# Patient Record
Sex: MALE | Race: WHITE
[De-identification: names, ages, dates, MRNs, and addresses within clinical notes are randomized per-mention and may not be internally consistent; named-entity substitution may affect disease eponyms.]

---

## 2022-01-07 ENCOUNTER — HOSPITAL ENCOUNTER (INPATIENT)
Dept: HOSPITAL 47 - EC | Age: 42
LOS: 3 days | Discharge: HOME | DRG: 177 | End: 2022-01-10
Attending: INTERNAL MEDICINE | Admitting: INTERNAL MEDICINE
Payer: COMMERCIAL

## 2022-01-07 DIAGNOSIS — J96.01: ICD-10-CM

## 2022-01-07 DIAGNOSIS — U07.1: Primary | ICD-10-CM

## 2022-01-07 DIAGNOSIS — J12.82: ICD-10-CM

## 2022-01-07 DIAGNOSIS — Z79.899: ICD-10-CM

## 2022-01-07 DIAGNOSIS — J84.9: ICD-10-CM

## 2022-01-07 DIAGNOSIS — U09.9: ICD-10-CM

## 2022-01-07 LAB
ALBUMIN SERPL-MCNC: 4.3 G/DL (ref 3.5–5)
ALP SERPL-CCNC: 39 U/L (ref 38–126)
ALT SERPL-CCNC: 24 U/L (ref 4–49)
ANION GAP SERPL CALC-SCNC: 15 MMOL/L
APTT BLD: 24.7 SEC (ref 22–30)
AST SERPL-CCNC: 61 U/L (ref 17–59)
BASOPHILS # BLD AUTO: 0 K/UL (ref 0–0.2)
BASOPHILS NFR BLD AUTO: 0 %
BUN SERPL-SCNC: 28 MG/DL (ref 9–20)
CALCIUM SPEC-MCNC: 9.2 MG/DL (ref 8.4–10.2)
CHLORIDE SERPL-SCNC: 105 MMOL/L (ref 98–107)
CO2 SERPL-SCNC: 17 MMOL/L (ref 22–30)
EOSINOPHIL # BLD AUTO: 0 K/UL (ref 0–0.7)
EOSINOPHIL NFR BLD AUTO: 0 %
ERYTHROCYTE [DISTWIDTH] IN BLOOD BY AUTOMATED COUNT: 5.1 M/UL (ref 4.3–5.9)
ERYTHROCYTE [DISTWIDTH] IN BLOOD: 12.6 % (ref 11.5–15.5)
GLUCOSE SERPL-MCNC: 155 MG/DL (ref 74–99)
HCT VFR BLD AUTO: 45.8 % (ref 39–53)
HGB BLD-MCNC: 15.9 GM/DL (ref 13–17.5)
INR PPP: 1 (ref ?–1.2)
LYMPHOCYTES # SPEC AUTO: 0.7 K/UL (ref 1–4.8)
LYMPHOCYTES NFR SPEC AUTO: 5 %
MCH RBC QN AUTO: 31.2 PG (ref 25–35)
MCHC RBC AUTO-ENTMCNC: 34.8 G/DL (ref 31–37)
MCV RBC AUTO: 89.8 FL (ref 80–100)
MONOCYTES # BLD AUTO: 0.5 K/UL (ref 0–1)
MONOCYTES NFR BLD AUTO: 4 %
NEUTROPHILS # BLD AUTO: 12 K/UL (ref 1.3–7.7)
NEUTROPHILS NFR BLD AUTO: 90 %
PLATELET # BLD AUTO: 214 K/UL (ref 150–450)
POTASSIUM SERPL-SCNC: 5.5 MMOL/L (ref 3.5–5.1)
PROT SERPL-MCNC: 7.8 G/DL (ref 6.3–8.2)
PT BLD: 10.3 SEC (ref 9–12)
SODIUM SERPL-SCNC: 137 MMOL/L (ref 137–145)
WBC # BLD AUTO: 13.3 K/UL (ref 3.8–10.6)

## 2022-01-07 PROCEDURE — 87635 SARS-COV-2 COVID-19 AMP PRB: CPT

## 2022-01-07 PROCEDURE — 86140 C-REACTIVE PROTEIN: CPT

## 2022-01-07 PROCEDURE — 83615 LACTATE (LD) (LDH) ENZYME: CPT

## 2022-01-07 PROCEDURE — 96374 THER/PROPH/DIAG INJ IV PUSH: CPT

## 2022-01-07 PROCEDURE — 83605 ASSAY OF LACTIC ACID: CPT

## 2022-01-07 PROCEDURE — 85610 PROTHROMBIN TIME: CPT

## 2022-01-07 PROCEDURE — 36415 COLL VENOUS BLD VENIPUNCTURE: CPT

## 2022-01-07 PROCEDURE — 71275 CT ANGIOGRAPHY CHEST: CPT

## 2022-01-07 PROCEDURE — 85379 FIBRIN DEGRADATION QUANT: CPT

## 2022-01-07 PROCEDURE — 84145 PROCALCITONIN (PCT): CPT

## 2022-01-07 PROCEDURE — 80048 BASIC METABOLIC PNL TOTAL CA: CPT

## 2022-01-07 PROCEDURE — 83735 ASSAY OF MAGNESIUM: CPT

## 2022-01-07 PROCEDURE — 94640 AIRWAY INHALATION TREATMENT: CPT

## 2022-01-07 PROCEDURE — 93005 ELECTROCARDIOGRAM TRACING: CPT

## 2022-01-07 PROCEDURE — 85025 COMPLETE CBC W/AUTO DIFF WBC: CPT

## 2022-01-07 PROCEDURE — 96361 HYDRATE IV INFUSION ADD-ON: CPT

## 2022-01-07 PROCEDURE — 99285 EMERGENCY DEPT VISIT HI MDM: CPT

## 2022-01-07 PROCEDURE — 80053 COMPREHEN METABOLIC PANEL: CPT

## 2022-01-07 PROCEDURE — 84484 ASSAY OF TROPONIN QUANT: CPT

## 2022-01-07 PROCEDURE — 71046 X-RAY EXAM CHEST 2 VIEWS: CPT

## 2022-01-07 PROCEDURE — 71045 X-RAY EXAM CHEST 1 VIEW: CPT

## 2022-01-07 PROCEDURE — 85730 THROMBOPLASTIN TIME PARTIAL: CPT

## 2022-01-07 PROCEDURE — 83880 ASSAY OF NATRIURETIC PEPTIDE: CPT

## 2022-01-07 RX ADMIN — CEFAZOLIN SCH MLS/HR: 330 INJECTION, POWDER, FOR SOLUTION INTRAMUSCULAR; INTRAVENOUS at 23:31

## 2022-01-07 NOTE — ED
SOB HPI





- General


Chief Complaint: Shortness of Breath


Stated Complaint: SOB,Low O2 Sat.


Time Seen by Provider: 01/07/22 19:10


Source: patient, RN notes reviewed


Mode of arrival: ambulatory


Limitations: no limitations





- History of Present Illness


Initial Comments: 





Patient is a 41-year-old male that presents to the emergency department 

complaining of shortness of breath and cough for the past all days.  He notes he

did test positive for Covid.  He notes that he was followed by his primary 

caregiver and steroids and inhaler.  He came back to ER today due to increasing 

cough and continuing symptoms.  He was otherwise well-appearing.  He denied any 

other issues or complaints.  He denied chest pain headache nausea vomiting 

diarrhea constipation fever fatigue chills.





- Related Data


                                Home Medications











 Medication  Instructions  Recorded  Confirmed


 


Acetaminophen Tab [Tylenol Tab] 1,000 mg PO Q6HR PRN 01/07/22 01/07/22


 


Albuterol Inhaler [Ventolin Hfa 2 puff INHALATION RT-QID PRN 01/07/22 01/07/22





Inhaler]   


 


Ascorbic Acid [Vitamin C] 1,000 mg PO DAILY 01/07/22 01/07/22


 


Benzonatate [Benzonatate Perle] 200 mg PO TID PRN 01/07/22 01/07/22


 


Ibuprofen [Motrin Ib] 800 mg PO Q8H PRN 01/07/22 01/07/22


 


Multivitamins, Thera [Multivitamin 1 tab PO DAILY 01/07/22 01/07/22





(formulary)]   


 


predniSONE [Deltasone] 20 mg PO TID 01/07/22 01/07/22











                                    Allergies











Allergy/AdvReac Type Severity Reaction Status Date / Time


 


No Known Allergies Allergy   Verified 01/07/22 21:51














Review of Systems


ROS Statement: 


Those systems with pertinent positive or pertinent negative responses have been 

documented in the HPI.





ROS Other: All systems not noted in ROS Statement are negative.





Past Medical History


Past Medical History: No Reported History


History of Any Multi-Drug Resistant Organisms: None Reported


Past Surgical History: No Surgical Hx Reported


Past Psychological History: No Psychological Hx Reported


Smoking Status: Never smoker


Past Alcohol Use History: Occasional


Past Drug Use History: None Reported





General Exam


Limitations: no limitations


General appearance: alert, in no apparent distress


Head exam: Present: atraumatic, normocephalic, normal inspection


Eye exam: Present: normal appearance, PERRL, EOMI.  Absent: scleral icterus, 

conjunctival injection, periorbital swelling


ENT exam: Present: normal exam, mucous membranes moist


Neck exam: Present: normal inspection


Respiratory exam: Present: normal lung sounds bilaterally.  Absent: respiratory 

distress, wheezes, rales, rhonchi, stridor


Cardiovascular Exam: Present: regular rate, normal rhythm, normal heart sounds. 

 Absent: systolic murmur, diastolic murmur, rubs, gallop, clicks


GI/Abdominal exam: Present: soft, normal bowel sounds.  Absent: distended, 

tenderness, guarding, rebound, rigid


Extremities exam: Present: normal inspection, full ROM, normal capillary refill.

  Absent: tenderness, pedal edema, joint swelling, calf tenderness


Neurological exam: Present: alert, oriented X3


Psychiatric exam: Present: normal affect, normal mood


Skin exam: Present: warm, dry, intact, normal color.  Absent: rash





Course


                                   Vital Signs











  01/07/22 01/07/22 01/07/22





  19:01 22:30 22:36


 


Temperature 98.7 F  


 


Pulse Rate 86 79 79


 


Respiratory 20 18 18





Rate   


 


Blood Pressure 127/74 120/69 


 


O2 Sat by Pulse 92 L 88 L 94 L





Oximetry   














Procedures





- Universal Protocol (Time Out)


Nurse: Rojas Alanis





Medical Decision Making





- Medical Decision Making





41-year-old male complaining of shortness of breath and continuing Covid 

symptoms.


Labs, chest x-ray, EKG, 10 mg Decadron, albuterol inhaler ordered.


Labs: White blood cells 13.3, d-dimer 0.6 to CT of the chest ordered.  CMP shows

 lactic acid 2.3 rest of labs are unremarkable.


Chest x-ray and computed tomography scan both show interstitial pneumonia.


 computed tomography scan patient was coughing and was unable to breathe, 2 L of

 oxygen via nasal cannula ordered.


discussed with Dr. Corcoran, Patient will be admitted for hypoxia and Covid.


[facility] [place] [facility] was consulted and will accept the admit.





- Lab Data


Result diagrams: 


                                 01/07/22 19:22





                                 01/07/22 19:22


                                   Lab Results











  01/07/22 01/07/22 01/07/22 Range/Units





  19:22 19:22 19:22 


 


WBC  13.3 H    (3.8-10.6)  k/uL


 


RBC  5.10    (4.30-5.90)  m/uL


 


Hgb  15.9    (13.0-17.5)  gm/dL


 


Hct  45.8    (39.0-53.0)  %


 


MCV  89.8    (80.0-100.0)  fL


 


MCH  31.2    (25.0-35.0)  pg


 


MCHC  34.8    (31.0-37.0)  g/dL


 


RDW  12.6    (11.5-15.5)  %


 


Plt Count  214    (150-450)  k/uL


 


MPV  8.3    


 


Neutrophils %  90    %


 


Lymphocytes %  5    %


 


Monocytes %  4    %


 


Eosinophils %  0    %


 


Basophils %  0    %


 


Neutrophils #  12.0 H    (1.3-7.7)  k/uL


 


Lymphocytes #  0.7 L    (1.0-4.8)  k/uL


 


Monocytes #  0.5    (0-1.0)  k/uL


 


Eosinophils #  0.0    (0-0.7)  k/uL


 


Basophils #  0.0    (0-0.2)  k/uL


 


PT   10.3   (9.0-12.0)  sec


 


INR   1.0   (<1.2)  


 


APTT   24.7   (22.0-30.0)  sec


 


D-Dimer   0.62 H   (<0.60)  mg/L FEU


 


Sodium    137  (137-145)  mmol/L


 


Potassium    5.5 H  (3.5-5.1)  mmol/L


 


Chloride    105  ()  mmol/L


 


Carbon Dioxide    17 L  (22-30)  mmol/L


 


Anion Gap    15  mmol/L


 


BUN    28 H  (9-20)  mg/dL


 


Creatinine    0.62 L  (0.66-1.25)  mg/dL


 


Est GFR (CKD-EPI)AfAm    >90  (>60 ml/min/1.73 sqM)  


 


Est GFR (CKD-EPI)NonAf    >90  (>60 ml/min/1.73 sqM)  


 


Glucose    155 H  (74-99)  mg/dL


 


Lactic Ac Sepsis Rflx     


 


Plasma Lactic Acid Ash     (0.7-2.0)  mmol/L


 


Calcium    9.2  (8.4-10.2)  mg/dL


 


Total Bilirubin    1.2  (0.2-1.3)  mg/dL


 


AST    61 H  (17-59)  U/L


 


ALT    24  (4-49)  U/L


 


Alkaline Phosphatase    39  ()  U/L


 


Troponin I     (0.000-0.034)  ng/mL


 


NT-Pro-B Natriuret Pep     pg/mL


 


Total Protein    7.8  (6.3-8.2)  g/dL


 


Albumin    4.3  (3.5-5.0)  g/dL














  01/07/22 01/07/22 01/07/22 Range/Units





  19:22 19:22 19:22 


 


WBC     (3.8-10.6)  k/uL


 


RBC     (4.30-5.90)  m/uL


 


Hgb     (13.0-17.5)  gm/dL


 


Hct     (39.0-53.0)  %


 


MCV     (80.0-100.0)  fL


 


MCH     (25.0-35.0)  pg


 


MCHC     (31.0-37.0)  g/dL


 


RDW     (11.5-15.5)  %


 


Plt Count     (150-450)  k/uL


 


MPV     


 


Neutrophils %     %


 


Lymphocytes %     %


 


Monocytes %     %


 


Eosinophils %     %


 


Basophils %     %


 


Neutrophils #     (1.3-7.7)  k/uL


 


Lymphocytes #     (1.0-4.8)  k/uL


 


Monocytes #     (0-1.0)  k/uL


 


Eosinophils #     (0-0.7)  k/uL


 


Basophils #     (0-0.2)  k/uL


 


PT     (9.0-12.0)  sec


 


INR     (<1.2)  


 


APTT     (22.0-30.0)  sec


 


D-Dimer     (<0.60)  mg/L FEU


 


Sodium     (137-145)  mmol/L


 


Potassium     (3.5-5.1)  mmol/L


 


Chloride     ()  mmol/L


 


Carbon Dioxide     (22-30)  mmol/L


 


Anion Gap     mmol/L


 


BUN     (9-20)  mg/dL


 


Creatinine     (0.66-1.25)  mg/dL


 


Est GFR (CKD-EPI)AfAm     (>60 ml/min/1.73 sqM)  


 


Est GFR (CKD-EPI)NonAf     (>60 ml/min/1.73 sqM)  


 


Glucose     (74-99)  mg/dL


 


Lactic Ac Sepsis Rflx     


 


Plasma Lactic Acid Ash  2.3 H*    (0.7-2.0)  mmol/L


 


Calcium     (8.4-10.2)  mg/dL


 


Total Bilirubin     (0.2-1.3)  mg/dL


 


AST     (17-59)  U/L


 


ALT     (4-49)  U/L


 


Alkaline Phosphatase     ()  U/L


 


Troponin I   <0.012   (0.000-0.034)  ng/mL


 


NT-Pro-B Natriuret Pep    104  pg/mL


 


Total Protein     (6.3-8.2)  g/dL


 


Albumin     (3.5-5.0)  g/dL














  01/07/22 01/07/22 Range/Units





  19:50 22:30 


 


WBC    (3.8-10.6)  k/uL


 


RBC    (4.30-5.90)  m/uL


 


Hgb    (13.0-17.5)  gm/dL


 


Hct    (39.0-53.0)  %


 


MCV    (80.0-100.0)  fL


 


MCH    (25.0-35.0)  pg


 


MCHC    (31.0-37.0)  g/dL


 


RDW    (11.5-15.5)  %


 


Plt Count    (150-450)  k/uL


 


MPV    


 


Neutrophils %    %


 


Lymphocytes %    %


 


Monocytes %    %


 


Eosinophils %    %


 


Basophils %    %


 


Neutrophils #    (1.3-7.7)  k/uL


 


Lymphocytes #    (1.0-4.8)  k/uL


 


Monocytes #    (0-1.0)  k/uL


 


Eosinophils #    (0-0.7)  k/uL


 


Basophils #    (0-0.2)  k/uL


 


PT    (9.0-12.0)  sec


 


INR    (<1.2)  


 


APTT    (22.0-30.0)  sec


 


D-Dimer    (<0.60)  mg/L FEU


 


Sodium    (137-145)  mmol/L


 


Potassium    (3.5-5.1)  mmol/L


 


Chloride    ()  mmol/L


 


Carbon Dioxide    (22-30)  mmol/L


 


Anion Gap    mmol/L


 


BUN    (9-20)  mg/dL


 


Creatinine    (0.66-1.25)  mg/dL


 


Est GFR (CKD-EPI)AfAm    (>60 ml/min/1.73 sqM)  


 


Est GFR (CKD-EPI)NonAf    (>60 ml/min/1.73 sqM)  


 


Glucose    (74-99)  mg/dL


 


Lactic Ac Sepsis Rflx  Y   


 


Plasma Lactic Acid Ash   1.2  (0.7-2.0)  mmol/L


 


Calcium    (8.4-10.2)  mg/dL


 


Total Bilirubin    (0.2-1.3)  mg/dL


 


AST    (17-59)  U/L


 


ALT    (4-49)  U/L


 


Alkaline Phosphatase    ()  U/L


 


Troponin I    (0.000-0.034)  ng/mL


 


NT-Pro-B Natriuret Pep    pg/mL


 


Total Protein    (6.3-8.2)  g/dL


 


Albumin    (3.5-5.0)  g/dL














- EKG Data


-: EKG Interpreted by Me


EKG shows normal: sinus rhythm


Rate: normal


EKG Comments: 





Ventricular rate 76 bpm, UT interval 150 ms, QRS duration 92 ms,  ms, 

normal sinus rhythm, normal ECG.





- Radiology Data


Radiology results: report reviewed, image reviewed





Chest x-ray: There is some patchy interstitial pneumonia with small right 

pleural effusion.


CT of the chest: No evidence of pulmonary embolus.  Patchy bilateral 

predominantly interstitial pneumonia there is some airspace infiltrate and 

atelectasis at the posterior lung bases.





Disposition


Clinical Impression: 


 Pneumonia due to COVID-19 virus, Hypoxia





Disposition: ADMITTED AS IP TO THIS HOSP


Condition: Stable


Is patient prescribed a controlled substance at d/c from ED?: No


Referrals: 


Naga Reyes DO [Primary Care Provider] - 1-2 days


Time of Disposition: 23:03

## 2022-01-07 NOTE — CT
EXAMINATION TYPE: CT chest angio for PE

 

DATE OF EXAM: 1/7/2022

 

COMPARISON: None

 

HISTORY: pe

 

CT DLP: mGycm

Automated exposure control for dose reduction was used.

 

CONTRAST: 

Performed with IV Contrast, patient injected with mL of Isovue 370.

 

There are Three-D postprocessed images.

 

There is patchy predominantly interstitial infiltrates throughout both lungs. There is some coalescen
t density at the posterior lung bases. There is some patchy atelectasis posterior lung bases. Heart s
ize is normal. There is no pericardial effusion. There are no hilar masses. There is no mediastinal a
denopathy. Thoracic aorta is intact. There is no aneurysm or dissection.

 

There is no evidence of filling defect in the pulmonary arteries.

 

The thoracic spine is intact. There is no compression fracture. Sternum is intact. I see no bony dest
ructive process.

 

IMPRESSION:

No evidence of pulmonary embolism. Patchy bilateral predominantly interstitial pneumonia. There is so
me airspace infiltrate and atelectasis at the posterior lung bases.

## 2022-01-07 NOTE — XR
EXAMINATION TYPE: XR chest 2V

 

DATE OF EXAM: 1/7/2022

 

COMPARISON: NONE

 

HISTORY: Difficulty breathing

 

TECHNIQUE: 2 views

 

FINDINGS: There is patchy interstitial infiltrates in the mid and lower lung fields bilaterally. Hear
t size is normal. There is no heart failure. There is mild blunting right costophrenic angle.

 

IMPRESSION: There are some patchy interstitial pneumonia with small right pleural effusion. Normal he
art.

## 2022-01-08 LAB
ANION GAP SERPL CALC-SCNC: 7 MMOL/L
BASOPHILS # BLD AUTO: 0 K/UL (ref 0–0.2)
BASOPHILS NFR BLD AUTO: 0 %
BUN SERPL-SCNC: 19 MG/DL (ref 9–20)
CALCIUM SPEC-MCNC: 8.4 MG/DL (ref 8.4–10.2)
CHLORIDE SERPL-SCNC: 112 MMOL/L (ref 98–107)
CO2 SERPL-SCNC: 23 MMOL/L (ref 22–30)
EOSINOPHIL # BLD AUTO: 0 K/UL (ref 0–0.7)
EOSINOPHIL NFR BLD AUTO: 0 %
ERYTHROCYTE [DISTWIDTH] IN BLOOD BY AUTOMATED COUNT: 4.63 M/UL (ref 4.3–5.9)
ERYTHROCYTE [DISTWIDTH] IN BLOOD: 12.8 % (ref 11.5–15.5)
GLUCOSE SERPL-MCNC: 153 MG/DL (ref 74–99)
HCT VFR BLD AUTO: 42.3 % (ref 39–53)
HGB BLD-MCNC: 14.2 GM/DL (ref 13–17.5)
LDH SPEC-CCNC: 787 U/L (ref 313–618)
LYMPHOCYTES # SPEC AUTO: 0.6 K/UL (ref 1–4.8)
LYMPHOCYTES NFR SPEC AUTO: 5 %
MAGNESIUM SPEC-SCNC: 2.3 MG/DL (ref 1.6–2.3)
MCH RBC QN AUTO: 30.6 PG (ref 25–35)
MCHC RBC AUTO-ENTMCNC: 33.4 G/DL (ref 31–37)
MCV RBC AUTO: 91.4 FL (ref 80–100)
MONOCYTES # BLD AUTO: 0.5 K/UL (ref 0–1)
MONOCYTES NFR BLD AUTO: 4 %
NEUTROPHILS # BLD AUTO: 11.3 K/UL (ref 1.3–7.7)
NEUTROPHILS NFR BLD AUTO: 90 %
PLATELET # BLD AUTO: 221 K/UL (ref 150–450)
POTASSIUM SERPL-SCNC: 4.7 MMOL/L (ref 3.5–5.1)
SODIUM SERPL-SCNC: 142 MMOL/L (ref 137–145)
WBC # BLD AUTO: 12.5 K/UL (ref 3.8–10.6)

## 2022-01-08 RX ADMIN — CEFAZOLIN SCH: 330 INJECTION, POWDER, FOR SOLUTION INTRAMUSCULAR; INTRAVENOUS at 09:14

## 2022-01-08 RX ADMIN — ENOXAPARIN SODIUM SCH MG: 40 INJECTION SUBCUTANEOUS at 10:25

## 2022-01-08 RX ADMIN — CEFAZOLIN SCH MLS/HR: 330 INJECTION, POWDER, FOR SOLUTION INTRAMUSCULAR; INTRAVENOUS at 22:00

## 2022-01-08 RX ADMIN — DEXTROSE SCH MG: 50 INJECTION, SOLUTION INTRAVENOUS at 10:25

## 2022-01-08 RX ADMIN — CEFAZOLIN SCH MLS/HR: 330 INJECTION, POWDER, FOR SOLUTION INTRAMUSCULAR; INTRAVENOUS at 12:19

## 2022-01-08 NOTE — P.HPIM
History of Present Illness


H&P Date: 01/07/22


Chief Complaint: Hypoxemia





41-year-old male with no significant past medical history





Patient comes in due to hypoxemia home.  He was diagnosed with Covid about 12 

days ago.  Patient is unvaccinated.  He initially had symptoms of upper 

respiratory infection coughing body aches loss of smell and taste however he 

started improving except for his coughing and shortness of breath.  His wife has

been checking his oxygen and noted his oxygen was low in the upper 80s today for

which she recommended that she comes hospital for evaluation.  He's been taking 

inhalers at home along with prednisone


Upon evaluation in the ED his oxygen saturation was in the low 90s and then 

dropped to 8788 percent while on CAT scan, computed tomography scan showed 

interstitial pneumonia.


This time patient tested negative for Covid in the hospital


However he continues to report fevers and chills along with productive cough.


He was initiated on oxygen his oxygen saturation has improved to 92% patient 

admitted for further monitoring and evaluation


Patient is not a smoker, denies any drug abuse





Review of Systems





 











Pertinent positives as noted in HPI. All other systems were reviewed and are 

negative 





Past Medical History


Past Medical History: No Reported History


History of Any Multi-Drug Resistant Organisms: None Reported


Past Surgical History: No Surgical Hx Reported


Past Psychological History: No Psychological Hx Reported


Smoking Status: Never smoker


Past Alcohol Use History: Occasional


Past Drug Use History: None Reported





- Past Family History


  ** Family


Family Medical History: No Reported History





Medications and Allergies


                                Home Medications











 Medication  Instructions  Recorded  Confirmed  Type


 


Acetaminophen Tab [Tylenol Tab] 1,000 mg PO Q6HR PRN 01/07/22 01/07/22 History


 


Albuterol Inhaler [Ventolin Hfa 2 puff INHALATION RT-QID PRN 01/07/22 01/07/22 

History





Inhaler]    


 


Ascorbic Acid [Vitamin C] 1,000 mg PO DAILY 01/07/22 01/07/22 History


 


Benzonatate [Benzonatate Perle] 200 mg PO TID PRN 01/07/22 01/07/22 History


 


Ibuprofen [Motrin Ib] 800 mg PO Q8H PRN 01/07/22 01/07/22 History


 


Multivitamins, Thera [Multivitamin 1 tab PO DAILY 01/07/22 01/07/22 History





(formulary)]    


 


predniSONE [Deltasone] 20 mg PO TID 01/07/22 01/07/22 History








                                    Allergies











Allergy/AdvReac Type Severity Reaction Status Date / Time


 


No Known Allergies Allergy   Verified 01/07/22 21:51














Physical Exam


Vitals: 


                                   Vital Signs











  Temp Pulse Pulse Resp BP BP Pulse Ox


 


 01/08/22 02:00  98.9 F   79  16   107/66  94 L


 


 01/08/22 00:33  98.4 F   74  17   115/68  91 L


 


 01/07/22 22:36   79   18    94 L


 


 01/07/22 22:30   79   18  120/69   88 L


 


 01/07/22 19:01  98.7 F  86   20  127/74   92 L








                                Intake and Output











 01/07/22 01/07/22 01/08/22





 14:59 22:59 06:59


 


Other:   


 


  Weight  97.522 kg 97.522 kg














Constitutional:          No acute distress, conversant, pleasant


Eyes:      Anicteric sclerae, moist conjunctiva, 


         Pupils equal round reactive to light





ENMT:      NC/AT


         Oropharynx clear, no erythema, or exudates





Neck:      Supple,  no masses, or JVD


         No carotid bruits


         No thyromegaly





Lungs:      Clear to auscultation


         Clear to percussion


         Normal respiratory effort, no accessory muscle use 





Cardiovascular:      Heart regular in rate and rhythm, 


         No murmurs, gallops, or rubs


         No peripheral edema





Abdominal:       Soft


         Nontender, no guarding, rebound or rigidity


         Abdomen moving with respiration


         Normoactive bowel sounds


         No hepatomegaly, No splenomegaly


         No palpable mass 


         No abdominal wall hernia noted 





Skin:      Normal temperature, tone, texture, turgor


         No induration


         No subcutaneous nodules 


         No rash, lesions


         No ulcers





Extremities:      No digital cyanosis 


         No clubbing


         Pedal pulses intact and symmetrical


         Radial pulses intact and symmetrical 


         No calf tenderness 





Psychiatric:      Alert and oriented to person, place and time


         Appropriate affect


         fair judgement   


      


Neuro      Muscles Strength 5/5 in all 4 extremities 


         Sensation to light touch grossly present throughout


         Cranial nerves II-XII grossly intact


         No focal sensory deficits


Lymphatics:       no palpable cervical or supraclavicular , or inguinal lymph 

nodes  





Results


CBC & Chem 7: 


                                 01/07/22 19:22





                                 01/07/22 19:22


Labs: 


                  Abnormal Lab Results - Last 24 Hours (Table)











  01/07/22 01/07/22 01/07/22 Range/Units





  19:22 19:22 19:22 


 


WBC  13.3 H    (3.8-10.6)  k/uL


 


Neutrophils #  12.0 H    (1.3-7.7)  k/uL


 


Lymphocytes #  0.7 L    (1.0-4.8)  k/uL


 


D-Dimer   0.62 H   (<0.60)  mg/L FEU


 


Potassium    5.5 H  (3.5-5.1)  mmol/L


 


Carbon Dioxide    17 L  (22-30)  mmol/L


 


BUN    28 H  (9-20)  mg/dL


 


Creatinine    0.62 L  (0.66-1.25)  mg/dL


 


Glucose    155 H  (74-99)  mg/dL


 


Plasma Lactic Acid Ash     (0.7-2.0)  mmol/L


 


AST    61 H  (17-59)  U/L














  01/07/22 Range/Units





  19:22 


 


WBC   (3.8-10.6)  k/uL


 


Neutrophils #   (1.3-7.7)  k/uL


 


Lymphocytes #   (1.0-4.8)  k/uL


 


D-Dimer   (<0.60)  mg/L FEU


 


Potassium   (3.5-5.1)  mmol/L


 


Carbon Dioxide   (22-30)  mmol/L


 


BUN   (9-20)  mg/dL


 


Creatinine   (0.66-1.25)  mg/dL


 


Glucose   (74-99)  mg/dL


 


Plasma Lactic Acid Ash  2.3 H*  (0.7-2.0)  mmol/L


 


AST   (17-59)  U/L














Thrombosis Risk Factor Assmnt





- Choose All That Apply


Each Factor Represents 1 point: Age 41-60 years


Thrombosis Risk Factor Assessment Total Risk Factor Score: 1


Thrombosis Risk Factor Assessment Level: Low Risk





Assessment and Plan


Assessment: 





Acute hypoxic respiratory failure


Post Covid pneumonia


Supportive care


Initiated on supplemental oxygen


Dexamethasone


DVT prophylaxis with Lovenox


Supportive care


Symptomatically control


Tessalon Perles for cough when necessary


Albuterol inhaler as needed





Patient is full code


DVT prophylaxis Lovenox daily


Anticipated length of stay less than 2 midnights

## 2022-01-08 NOTE — P.PN
Subjective


Progress Note Date: 01/08/22


Principal diagnosis: 





sob





Doing well. Still having sob when he moves around. On 4L of O2 now. Still having

cough. No chest pain. 





Objective





- Vital Signs


Vital signs: 


                                   Vital Signs











Temp  98.2 F   01/08/22 09:20


 


Pulse  72   01/08/22 09:20


 


Resp  17   01/08/22 09:20


 


BP  102/61   01/08/22 09:20


 


Pulse Ox  93 L  01/08/22 09:20








                                 Intake & Output











 01/07/22 01/08/22 01/08/22





 18:59 06:59 18:59


 


Intake Total   1040


 


Balance   1040


 


Weight  97.522 kg 


 


Intake:   


 


  Intake, IV Titration   1040





  Amount   


 


    Sodium Chloride 0.9% 1,   1040





    000 ml @ 130 mls/hr IV .   





    [id number] [facility] Rx#:[id number]   


 


Other:   


 


  # Voids  1 














- Exam





Constitutional: No acute distress, conversant, pleasant


Eyes:Anicteric sclerae, moist conjunctiva, no lid-lag, PERRLA, 


ENMT: Oropharynx clear, no erythema, exudates


Neck: Supple, FROM, no masses, or JVD, No carotid bruits, No thyromegaly


Lungs: Clear to auscultation, Clear to percussion, Normal respiratory effort, no

accessory muscle use 


Cardiovascular: Heart regular in rate and rhythm, No murmurs, gallops, or rubs, 

No peripheral edema


Abdominal: Soft, Nontender, no guarding, rebound or rigidity, Normoactive bowel 

sounds, No hepatomegaly, No splenomegaly, No palpable mass 


Skin: Normal temperature, tone, texture, turgor, no induration, No subcutaneous 

nodules, No rash, lesions, No ulcers


Extremities: No digital cyanosis, No clubbing, Pedal pulses intact and 

symmetrical, Radial pulses intact and symmetrical, No calf tenderness 


Psychiatric: Alert and oriented to person, place and time, appropriate affect, 

intact judgement         


Neuro: Muscles Strength 5/5 in all 4 extremities, Sensation to light touch 

grossly present throughout, Cranial nerves II-XII grossly intact, no focal 

sensory deficits








- Labs


CBC & Chem 7: 


                                 01/08/22 09:15





                                 01/08/22 09:15


Labs: 


                  Abnormal Lab Results - Last 24 Hours (Table)











  01/07/22 01/07/22 01/07/22 Range/Units





  19:22 19:22 19:22 


 


WBC  13.3 H    (3.8-10.6)  k/uL


 


Neutrophils #  12.0 H    (1.3-7.7)  k/uL


 


Lymphocytes #  0.7 L    (1.0-4.8)  k/uL


 


D-Dimer   0.62 H   (<0.60)  mg/L FEU


 


Potassium    5.5 H  (3.5-5.1)  mmol/L


 


Chloride     ()  mmol/L


 


Carbon Dioxide    17 L  (22-30)  mmol/L


 


BUN    28 H  (9-20)  mg/dL


 


Creatinine    0.62 L  (0.66-1.25)  mg/dL


 


Glucose    155 H  (74-99)  mg/dL


 


Plasma Lactic Acid Ash     (0.7-2.0)  mmol/L


 


AST    61 H  (17-59)  U/L














  01/07/22 01/08/22 01/08/22 Range/Units





  19:22 09:15 09:15 


 


WBC   12.5 H   (3.8-10.6)  k/uL


 


Neutrophils #   11.3 H   (1.3-7.7)  k/uL


 


Lymphocytes #   0.6 L   (1.0-4.8)  k/uL


 


D-Dimer     (<0.60)  mg/L FEU


 


Potassium     (3.5-5.1)  mmol/L


 


Chloride    112 H  ()  mmol/L


 


Carbon Dioxide     (22-30)  mmol/L


 


BUN     (9-20)  mg/dL


 


Creatinine    0.61 L  (0.66-1.25)  mg/dL


 


Glucose    153 H  (74-99)  mg/dL


 


Plasma Lactic Acid Ash  2.3 H*    (0.7-2.0)  mmol/L


 


AST     (17-59)  U/L














Assessment and Plan


Plan: 





Acute hypoxic respiratory failure secondary to covid pneumonia


Supportive care


Initiated on supplemental oxygen, currently on 4 L


Dexamethasone


DVT prophylaxis with Lovenox


Tessalon Perles for cough when necessary


Albuterol inhaler as needed





DVT prophylaxis Lovenox daily

## 2022-01-08 NOTE — P.CNPUL
History of Present Illness


Consult date: 01/08/22


Requesting physician: Rajinder Scott


Reason for consult: dyspnea, abnormal CXR/CT


Chief complaint: Fever, shortness of breath, cough


History of present illness: 





This a very pleasant 41-year-old gentleman who has no significant past medical 

history.  On December 27 he started having symptoms of fever or shortness of 

breath cough and congestion.  He states he tested positive then.  He is treated 

by his PCP with inhalers and prednisone. His symptoms did not improve and he 

presented here yesterday for the same.  He was found to be positive for COVID-19

pneumonia.  He is outside the window for Remdesivir.  He is seen today in 

consultation on the regular medical floor.  Awake and alert in no acute 

distress.  He's on 4 L nasal cannula to maintain O2 saturation 93%.  He has 

normal saline at 130 ML's per hour.  He's been initiated on Decadron and 

Lovenox.  We will initiate vitamins.  His x-ray shows patchy interstitial 

pneumonia.  CT angiogram shows no evidence of pulmonary embolism.  There is 

patchy bilateral interstitial pneumonia consistent with COVID-19 infection.  

White count 12.5.  Hemoglobin 14.2.  Lymphocytes 0.6.  Sodium 142.  Potassium 

4.7.  Creatinine 0.61.  COVID-19 19 screen here is negative.  He is seen today 

in consultation on the regular medical floor.  He is currently sitting up in a 

chair at the bedside.  Awake and alert in no acute distress.  He is maintaining 

O2 saturations at 93% on 4 L/m per nasal cannula.  He's been afebrile.  

Hemodynamically stable.  He's been initiated on to Decadron Lovenox, vitamin 

supplements





Review of Systems





REVIEW OF SYSTEMS:


CONSTITUTIONAL: Positive for fever.  Denies any recent significant weight loss 

or weight gain.


EYES: Denies change in vision.


EARS, NOSE, MOUTH, THROAT: Denies headaches, denies sore throat.


CARDIOVASCULAR: Denies chest pain, palpitations or syncopal episodes.


RESPIRATORY: Positive for shortness of breath, cough, congestion no hemoptysis.


GASTROINTESTINAL: Denies change in appetite, denies abdominal pain


GENITOURINARY: Denies hematuria, denies infections.


MUSKULOSKELETAL: Denies pain, denies swelling.


INTEGUMENTARY: Denies rash, denies eczema.


NEUROLOGICAL: Denies recent memory loss, no recent seizure activity. 


PSYCHIATRIC: Denies anxiety, denies depression.


HEMATOLOGIC/LYMPHATIC: Denies anemia, denies enlarged lymph nodes.








Past Medical History


Past Medical History: No Reported History


History of Any Multi-Drug Resistant Organisms: None Reported


Past Surgical History: No Surgical Hx Reported


Past Psychological History: No Psychological Hx Reported


Smoking Status: Never smoker


Past Alcohol Use History: Occasional


Past Drug Use History: None Reported





- Past Family History


  ** Family


Family Medical History: No Reported History





Medications and Allergies


                                Home Medications











 Medication  Instructions  Recorded  Confirmed  Type


 


Acetaminophen Tab [Tylenol Tab] 1,000 mg PO Q6HR PRN 01/07/22 01/07/22 History


 


Albuterol Inhaler [Ventolin Hfa 2 puff INHALATION RT-QID PRN 01/07/22 01/07/22 

History





Inhaler]    


 


Ascorbic Acid [Vitamin C] 1,000 mg PO DAILY 01/07/22 01/07/22 History


 


Benzonatate [Benzonatate Perle] 200 mg PO TID PRN 01/07/22 01/07/22 History


 


Ibuprofen [Motrin Ib] 800 mg PO Q8H PRN 01/07/22 01/07/22 History


 


Multivitamins, Thera [Multivitamin 1 tab PO DAILY 01/07/22 01/07/22 History





(formulary)]    


 


predniSONE [Deltasone] 20 mg PO TID 01/07/22 01/07/22 History








                                    Allergies











Allergy/AdvReac Type Severity Reaction Status Date / Time


 


No Known Allergies Allergy   Verified 01/07/22 21:51














Physical Exam


Vitals: 


                                   Vital Signs











  Temp Pulse Pulse Resp BP BP Pulse Ox


 


 01/08/22 09:20  98.2 F   72  17   102/61  93 L


 


 01/08/22 09:02        93 L


 


 01/08/22 06:22  98.1 F   80  16   105/49  93 L


 


 01/08/22 02:00  98.9 F   79  16   107/66  94 L


 


 01/08/22 00:33  98.4 F   74  17   115/68  91 L


 


 01/07/22 22:36   79   18    94 L


 


 01/07/22 22:30   79   18  120/69   88 L


 


 01/07/22 19:01  98.7 F  86   20  127/74   92 L








                                Intake and Output











 01/07/22 01/08/22 01/08/22





 22:59 06:59 14:59


 


Intake Total   1040


 


Balance   1040


 


Intake:   


 


  Intake, IV Titration   1040





  Amount   


 


    Sodium Chloride 0.9% 1,   1040





    000 ml @ 130 mls/hr IV .   





    Q7H42M [facility] Rx#:[id number]   


 


Other:   


 


  # Voids  1 


 


  Weight 97.522 kg 97.522 kg 














GENERAL EXAM: Alert, pleasant 41-year-old male patient, on 4 L nasal cannula, 

fairly comfortable in no apparent distress.


HEAD: Normocephalic.


EYES: Normal reaction of pupils, equal size.


NOSE: Clear with pink turbinates.


THROAT: No erythema or exudates.


NECK: No masses, no JVD.


CHEST: No chest wall deformity.


LUNGS: Equal air entry with crackles in the bilateral bases.


CVS: S1 and S2 normal with no audible murmur, regular rhythm.


ABDOMEN: No hepatosplenomegaly, normal bowel sounds, no guarding or rigidity.


SPINE: No scoliosis or deformity


SKIN: No rashes


CENTRAL NERVOUS SYSTEM: No focal deficits, tone is normal in all 4 extremities.


EXTREMITIES: There is no peripheral edema.  No clubbing, no cyanosis.  

Peripheral pulses are intact.





Results





- Laboratory Findings


CBC and BMP: 


                                 01/08/22 09:15





                                 01/08/22 09:15


PT/INR, D-dimer











PT  10.3 sec (9.0-12.0)   01/07/22  19:22    


 


INR  1.0  (<1.2)   01/07/22  19:22    


 


D-Dimer  0.62 mg/L FEU (<0.60)  H  01/07/22  19:22    








Abnormal lab findings: 


                                  Abnormal Labs











  01/07/22 01/07/22 01/07/22





  19:22 19:22 19:22


 


WBC  13.3 H  


 


Neutrophils #  12.0 H  


 


Lymphocytes #  0.7 L  


 


D-Dimer   0.62 H 


 


Potassium    5.5 H


 


Chloride   


 


Carbon Dioxide    17 L


 


BUN    28 H


 


Creatinine    0.62 L


 


Glucose    155 H


 


Plasma Lactic Acid Ash   


 


AST    61 H














  01/07/22 01/08/22 01/08/22





  19:22 09:15 09:15


 


WBC   12.5 H 


 


Neutrophils #   11.3 H 


 


Lymphocytes #   0.6 L 


 


D-Dimer   


 


Potassium   


 


Chloride    112 H


 


Carbon Dioxide   


 


BUN   


 


Creatinine    0.61 L


 


Glucose    153 H


 


Plasma Lactic Acid Ash  2.3 H*  


 


AST   














- Diagnostic Findings


Chest x-ray: image reviewed


CT scan - chest: image reviewed





Assessment and Plan


Assessment: 





1 Acute hypoxemic respiratory failure secondary COVID-19 pneumonia.  Diagnosed 

in the outpatient setting.  Outside the window for Remdesivir.  Currently on 4 L

 nasal cannula.  Not vaccinated.





Plan:





The patient was seen and evaluated today


X-ray, CAT scans and labs reviewed


Continue Decadron, Lovenox, vitamin supplements


Titrate the FiO2 as tolerated


Increase his activity as tolerated


Add an incentive spirometer


We will continue to follow and make further recommendations based on his 

clinical status





I, the cosigning physician, performed a history & physical examination of the 

patient. Lungs sounds faint crackles in the posterior bases.  Maintaining good 

O2 saturations in the 90s on 4 L/m per nasal cannula.  I discussed the 

assessment and plan of care with my nurse practitioner, Manju Gordon. I attest to 

the above consultation as dictated by her.


Time with Patient: Greater than 30

## 2022-01-09 LAB
ANION GAP SERPL CALC-SCNC: 15.8 MMOL/L (ref 10–18)
BASOPHILS # BLD AUTO: 0.02 X 10*3/UL (ref 0–0.1)
BASOPHILS NFR BLD AUTO: 0.1 %
BUN SERPL-SCNC: 16.7 MG/DL (ref 9–27)
BUN/CREAT SERPL: 23.49 RATIO (ref 12–20)
CALCIUM SPEC-MCNC: 8.6 MG/DL (ref 8.7–10.3)
CHLORIDE SERPL-SCNC: 109 MMOL/L (ref 96–109)
CO2 SERPL-SCNC: 18.3 MMOL/L (ref 20–27.5)
EOSINOPHIL # BLD AUTO: 0 X 10*3/UL (ref 0.04–0.35)
EOSINOPHIL NFR BLD AUTO: 0 %
ERYTHROCYTE [DISTWIDTH] IN BLOOD BY AUTOMATED COUNT: 4.51 X 10*6/UL (ref 4.4–5.6)
ERYTHROCYTE [DISTWIDTH] IN BLOOD: 13.3 % (ref 11.5–14.5)
GLUCOSE SERPL-MCNC: 109 MG/DL (ref 70–110)
HCT VFR BLD AUTO: 41.9 % (ref 39.6–50)
HGB BLD-MCNC: 13.9 G/DL (ref 13–17)
LYMPHOCYTES # SPEC AUTO: 0.84 X 10*3/UL (ref 0.9–5)
LYMPHOCYTES NFR SPEC AUTO: 6.1 %
MAGNESIUM SPEC-SCNC: 2.3 MG/DL (ref 1.5–2.4)
MCH RBC QN AUTO: 30.8 PG (ref 27–32)
MCHC RBC AUTO-ENTMCNC: 33.2 G/DL (ref 32–37)
MCV RBC AUTO: 92.9 FL (ref 80–97)
MONOCYTES # BLD AUTO: 0.84 X 10*3/UL (ref 0.2–1)
MONOCYTES NFR BLD AUTO: 6.1 %
NEUTROPHILS # BLD AUTO: 12.08 X 10*3/UL (ref 1.8–7.7)
NEUTROPHILS NFR BLD AUTO: 87.1 %
PLATELET # BLD AUTO: 232 X 10*3/UL (ref 140–440)
POTASSIUM SERPL-SCNC: 5 MMOL/L (ref 3.5–5.5)
SODIUM SERPL-SCNC: 143 MMOL/L (ref 135–145)
WBC # BLD AUTO: 13.86 X 10*3/UL (ref 4.5–10)

## 2022-01-09 RX ADMIN — Medication SCH MCG: at 08:03

## 2022-01-09 RX ADMIN — ENOXAPARIN SODIUM SCH MG: 40 INJECTION SUBCUTANEOUS at 08:03

## 2022-01-09 RX ADMIN — CEFAZOLIN SCH MLS/HR: 330 INJECTION, POWDER, FOR SOLUTION INTRAMUSCULAR; INTRAVENOUS at 08:03

## 2022-01-09 RX ADMIN — Medication SCH MG: at 08:03

## 2022-01-09 RX ADMIN — OXYCODONE HYDROCHLORIDE AND ACETAMINOPHEN SCH MG: 500 TABLET ORAL at 08:03

## 2022-01-09 RX ADMIN — DEXTROSE SCH MG: 50 INJECTION, SOLUTION INTRAVENOUS at 08:03

## 2022-01-09 NOTE — P.PN
Subjective


Progress Note Date: 01/09/22


Principal diagnosis: 





sob





Doing well. STill requring 4L of NC.





Objective





- Vital Signs


Vital signs: 


                                   Vital Signs











Temp  97.3 F L  01/09/22 09:06


 


Pulse  56 L  01/09/22 09:06


 


Resp  17   01/09/22 09:06


 


BP  111/68   01/09/22 09:06


 


Pulse Ox  97   01/09/22 09:06








                                 Intake & Output











 01/08/22 01/09/22 01/09/22





 18:59 06:59 18:59


 


Intake Total 1040 1600 400


 


Balance 1040 1600 400


 


Intake:   


 


  Intake, IV Titration 1040 600 400





  Amount   


 


    Sodium Chloride 0.9% 1, 1040 600 400





    000 ml @ 50 mls/hr IV .   





    Q20H [facility] Rx#:[id number]   


 


  Oral  1000 


 


Other:   


 


  # Voids  2 














- Exam





Constitutional: No acute distress, conversant, pleasant


Eyes:Anicteric sclerae, moist conjunctiva, no lid-lag, PERRLA, 


ENMT: Oropharynx clear, no erythema, exudates


Neck: Supple, FROM, no masses, or JVD, No carotid bruits, No thyromegaly


Lungs: Clear to auscultation, Clear to percussion, Normal respiratory effort, no

accessory muscle use 


Cardiovascular: Heart regular in rate and rhythm, No murmurs, gallops, or rubs, 

No peripheral edema


Abdominal: Soft, Nontender, no guarding, rebound or rigidity, Normoactive bowel 

sounds, No hepatomegaly, No splenomegaly, No palpable mass 


Skin: Normal temperature, tone, texture, turgor, no induration, No subcutaneous 

nodules, No rash, lesions, No ulcers


Extremities: No digital cyanosis, No clubbing, Pedal pulses intact and sy

mmetrical, Radial pulses intact and symmetrical, No calf tenderness 


Psychiatric: Alert and oriented to person, place and time, appropriate affect, 

intact judgement         


Neuro: Muscles Strength 5/5 in all 4 extremities, Sensation to light touch 

grossly present throughout, Cranial nerves II-XII grossly intact, no focal 

sensory deficits








- Labs


CBC & Chem 7: 


                                 01/09/22 06:56





                                 01/09/22 06:56


Labs: 


                  Abnormal Lab Results - Last 24 Hours (Table)











  01/08/22 01/09/22 01/09/22 Range/Units





  14:51 06:56 06:56 


 


WBC   13.86 H   (4.50-10.00)  X 10*3/uL


 


Immature Gran #   0.08 H   (0.00-0.04)  X 10*3/uL


 


Neutrophils #   12.08 H   (1.80-7.70)  X 10*3/uL


 


Lymphocytes #   0.84 L   (0.90-5.00)  X 10*3/uL


 


Eosinophils #   0 L   (0.04-0.35)  X 10*3/uL


 


Carbon Dioxide    18.3 L  (20.0-27.5)  mmol/L


 


BUN/Creatinine Ratio    23.49 H  (12.00-20.00)  Ratio


 


Calcium    8.6 L  (8.7-10.3)  mg/dL


 


Lactate Dehydrogenase  787 H    (313-618)  U/L


 


C-Reactive Protein  3.9 H    (<1.0)  mg/dL














Assessment and Plan


Plan: 





Acute hypoxic respiratory failure secondary to covid pneumonia


Supportive care


Initiated on supplemental oxygen, currently on 4 L


Dexamethasone


DVT prophylaxis with Lovenox


Tessalon Perles for cough when necessary


Albuterol inhaler as needed





DVT prophylaxis Lovenox daily

## 2022-01-09 NOTE — P.PN
Subjective


Progress Note Date: 01/09/22


Principal diagnosis: 


 Dyspnea, cough, hypoxia





This a very pleasant 41-year-old gentleman who has no significant past medical 

history.  On December 27 he started having symptoms of fever or shortness of 

breath cough and congestion.  He states he tested positive then.  He is treated 

by his PCP with inhalers and prednisone. His symptoms did not improve and he 

presented here yesterday for the same.  He was found to be positive for COVID-19

pneumonia.  He is outside the window for Remdesivir.  He is seen today in 

consultation on the regular medical floor.  Awake and alert in no acute 

distress.  He's on 4 L nasal cannula to maintain O2 saturation 93%.  He has 

normal saline at 130 ML's per hour.  He's been initiated on Decadron and 

Lovenox.  We will initiate vitamins.  His x-ray shows patchy interstitial 

pneumonia.  CT angiogram shows no evidence of pulmonary embolism.  There is 

patchy bilateral interstitial pneumonia consistent with COVID-19 infection.  

White count 12.5.  Hemoglobin 14.2.  Lymphocytes 0.6.  Sodium 142.  Potassium 

4.7.  Creatinine 0.61.  COVID-19 19 screen here is negative.  He is seen today 

in consultation on the regular medical floor.  He is currently sitting up in a 

chair at the bedside.  Awake and alert in no acute distress.  He is maintaining 

O2 saturations at 93% on 4 L/m per nasal cannula.  He's been afebrile.  

Hemodynamically stable.  He's been initiated on to Decadron Lovenox, vitamin 

supplements





On 01/09/2022 patient seen in follow-up on medical surgical floor.  He is awake 

and alert, in no acute distress, he is on 4 L of oxygen pulse ox is 97%, 

subsequently O2 was dropped down to 3 L, he is breathing comfortably, he states 

his coughing is improving, although still does have occasional coughing fits 

especially with exertion, no phlegm production, continues on Decadron, continues

on prophylactic dose Lovenox, his pro-calcitonin was negative at 0.04, he was 

outside the window for Remdesivir, he continues on multivitamins, appetite is 

fair, no nausea vomiting or diarrhea, no acute events overnight.  His labs have 

been reviewed, his white blood cell count is 13.8, hemoglobin is 13.9, d-dimer 

was negative at 0.59, his electrolytes and renal profile were unremarkable, his 

LDH was 787, and CRP was 3.9, troponin was negative, proBNP was within normal 

limits at 104.





Objective





- Vital Signs


Vital signs: 


                                   Vital Signs











Temp  97.3 F L  01/09/22 09:06


 


Pulse  56 L  01/09/22 09:06


 


Resp  17   01/09/22 09:06


 


BP  111/68   01/09/22 09:06


 


Pulse Ox  97   01/09/22 09:06








                                 Intake & Output











 01/08/22 01/09/22 01/09/22





 18:59 06:59 18:59


 


Intake Total 1040 1600 400


 


Balance 1040 1600 400


 


Intake:   


 


  Intake, IV Titration 1040 600 400





  Amount   


 


    Sodium Chloride 0.9% 1, 1040 600 400





    000 ml @ 50 mls/hr IV .   





    Q20H [facility] Rx#:[id number]   


 


  Oral  1000 


 


Other:   


 


  # Voids  2 














- Exam


 GENERAL EXAM: Alert, very pleasant, 41-year-old white cell, on 4 L oxygen with 

pulse ox of 97%, comfortable in no apparent distress.


HEAD: Normocephalic/atraumatic.


EYES: Normal reaction of pupils, equal size.  Conjunctiva pink, sclera white.


NOSE: Clear with pink turbinates.


THROAT: No erythema or exudates.


NECK: No masses, no JVD, no thyroid enlargement, no adenopathy.


CHEST: No chest wall deformity.  Symmetrical expansion. 


LUNGS: Equal air entry with diffuse inspiratory crackles


CVS: Regular rate and rhythm, normal S1 and S2, no gallops, no murmurs, no rubs


ABDOMEN: Soft, nontender.  No hepatosplenomegaly, normal bowel sounds, no 

guarding or rigidity.


EXTREMITIES: No clubbing, no edema, no cyanosis, 2+ pulses and upper and lower 

extremities.


MUSCULOSKELETAL: Muscle strength and tone normal.


SPINE: No scoliosis or deformity


SKIN: No rashes


CENTRAL NERVOUS SYSTEM: Alert and oriented -3.  No focal deficits, tone is 

normal in all 4 extremities.


PSYCHIATRIC: Alert and oriented -3.  Appropriate affect.  Intact judgment and 

insight.











- Labs


CBC & Chem 7: 


                                 01/09/22 06:56





                                 01/09/22 06:56


Labs: 


                  Abnormal Lab Results - Last 24 Hours (Table)











  01/08/22 01/09/22 01/09/22 Range/Units





  14:51 06:56 06:56 


 


WBC   13.86 H   (4.50-10.00)  X 10*3/uL


 


Immature Gran #   0.08 H   (0.00-0.04)  X 10*3/uL


 


Neutrophils #   12.08 H   (1.80-7.70)  X 10*3/uL


 


Lymphocytes #   0.84 L   (0.90-5.00)  X 10*3/uL


 


Eosinophils #   0 L   (0.04-0.35)  X 10*3/uL


 


Carbon Dioxide    18.3 L  (20.0-27.5)  mmol/L


 


BUN/Creatinine Ratio    23.49 H  (12.00-20.00)  Ratio


 


Calcium    8.6 L  (8.7-10.3)  mg/dL


 


Lactate Dehydrogenase  787 H    (313-618)  U/L


 


C-Reactive Protein  3.9 H    (<1.0)  mg/dL














Assessment and Plan


Plan: 


 Assessment:





#1.  Acute hypoxic respiratory failure related to acute COVID-19 pneumonia, 

diagnosed in the outpatient setting.  Patient presented to the emergency 

department on 01/07/2022, onset of symptoms was on 12/27/2021.  Patient is 

outside the window for Remdesivir, he is currently on Decadron 6 mg, 

prophylactic Lovenox, and multivitamins, CT angiogram shows no evidence of 

pulmonary embolism





#2.  Elevated inflammatory markers related to the above





Plan:





Continue current medical treatment


Continue Decadron, continue Lovenox and multivitamins


Patient is breathing easier,


His coughing is improving


Wean FiO2


Continue to monitor him for any worsening dyspnea or hypoxia


If remains stable we may consider discharge home in the next 24-48 hours





I performed a history & physical examination of the patient and discussed their 

management with my nurse practitioner, Lydia Maldonado.  I reviewed the nurse 

practitioner's note and agree with the documented findings and plan of care.  

Lung sounds are positive for dim breath sounds throughout the lung fields.  The 

findings and the impression was discussed with the patient.  I attest to the 

documentation by the nurse practitioner. 





Time with Patient: Less than 30

## 2022-01-10 VITALS — DIASTOLIC BLOOD PRESSURE: 75 MMHG | HEART RATE: 60 BPM | RESPIRATION RATE: 19 BRPM | SYSTOLIC BLOOD PRESSURE: 115 MMHG

## 2022-01-10 VITALS — TEMPERATURE: 97.8 F

## 2022-01-10 LAB — LDH SPEC-CCNC: 310 U/L (ref 120–246)

## 2022-01-10 RX ADMIN — DEXTROSE SCH MG: 50 INJECTION, SOLUTION INTRAVENOUS at 08:42

## 2022-01-10 RX ADMIN — ENOXAPARIN SODIUM SCH MG: 40 INJECTION SUBCUTANEOUS at 08:42

## 2022-01-10 RX ADMIN — Medication SCH MCG: at 08:41

## 2022-01-10 RX ADMIN — OXYCODONE HYDROCHLORIDE AND ACETAMINOPHEN SCH MG: 500 TABLET ORAL at 08:41

## 2022-01-10 RX ADMIN — Medication SCH MG: at 08:41

## 2022-01-10 NOTE — P.PN
Subjective


Progress Note Date: 01/10/22


Principal diagnosis: 


 Dyspnea, cough, hypoxia





This a very pleasant 41-year-old gentleman who has no significant past medical 

history.  On December 27 he started having symptoms of fever or shortness of 

breath cough and congestion.  He states he tested positive then.  He is treated 

by his PCP with inhalers and prednisone. His symptoms did not improve and he 

presented here yesterday for the same.  He was found to be positive for COVID-19

pneumonia.  He is outside the window for Remdesivir.  He is seen today in 

consultation on the regular medical floor.  Awake and alert in no acute 

distress.  He's on 4 L nasal cannula to maintain O2 saturation 93%.  He has 

normal saline at 130 ML's per hour.  He's been initiated on Decadron and 

Lovenox.  We will initiate vitamins.  His x-ray shows patchy interstitial 

pneumonia.  CT angiogram shows no evidence of pulmonary embolism.  There is 

patchy bilateral interstitial pneumonia consistent with COVID-19 infection.  

White count 12.5.  Hemoglobin 14.2.  Lymphocytes 0.6.  Sodium 142.  Potassium 

4.7.  Creatinine 0.61.  COVID-19 19 screen here is negative.  He is seen today 

in consultation on the regular medical floor.  He is currently sitting up in a 

chair at the bedside.  Awake and alert in no acute distress.  He is maintaining 

O2 saturations at 93% on 4 L/m per nasal cannula.  He's been afebrile.  

Hemodynamically stable.  He's been initiated on to Decadron Lovenox, vitamin 

supplements





On 01/09/2022 patient seen in follow-up on medical surgical floor.  He is awake 

and alert, in no acute distress, he is on 4 L of oxygen pulse ox is 97%, 

subsequently O2 was dropped down to 3 L, he is breathing comfortably, he states 

his coughing is improving, although still does have occasional coughing fits 

especially with exertion, no phlegm production, continues on Decadron, continues

on prophylactic dose Lovenox, his pro-calcitonin was negative at 0.04, he was 

outside the window for Remdesivir, he continues on multivitamins, appetite is 

fair, no nausea vomiting or diarrhea, no acute events overnight.  His labs have 

been reviewed, his white blood cell count is 13.8, hemoglobin is 13.9, d-dimer 

was negative at 0.59, his electrolytes and renal profile were unremarkable, his 

LDH was 787, and CRP was 3.9, troponin was negative, proBNP was within normal 

limits at 104.





On 01/10/2022 patient seen in follow-up on medical surgical floor, he is 

breathing much easier today, he is awake and alert, resting comfortably in bed, 

he is currently on room air with a pulse ox of 92%, vital signs have been 

stable, no acute events overnight, coughing and shortness of breath have 

improved, today's labs have been reviewed, his d-dimer 0.98, his inflammatory 

markers are improved and the LDH is down to 310, and CRP is down to 1.1, his 

pro-calcitonin level was negative, no acute events overnight





Objective





- Vital Signs


Vital signs: 


                                   Vital Signs











Temp  97.8 F   01/10/22 11:17


 


Pulse  60   01/10/22 11:17


 


Resp  19   01/10/22 11:17


 


BP  115/75   01/10/22 11:17


 


Pulse Ox  92 L  01/10/22 11:17








                                 Intake & Output











 01/09/22 01/10/22 01/10/22





 18:59 06:59 18:59


 


Intake Total 400  400


 


Balance 400  400


 


Intake:   


 


  Intake, IV Titration 400  400





  Amount   


 


    Sodium Chloride 0.9% 1, 400  400





    000 ml @ 50 mls/hr IV .   





    Q20H [facility] Rx#:[id number]   


 


Other:   


 


  # Voids  1 














- Exam


 GENERAL EXAM: Alert, very pleasant, 41-year-old white cell, on room air, 

comfortable in no apparent distress.


HEAD: Normocephalic/atraumatic.


EYES: Normal reaction of pupils, equal size.  Conjunctiva pink, sclera white.


NOSE: Clear with pink turbinates.


THROAT: No erythema or exudates.


NECK: No masses, no JVD, no thyroid enlargement, no adenopathy.


CHEST: No chest wall deformity.  Symmetrical expansion. 


LUNGS: Equal air entry with inspiratory crackles


CVS: Regular rate and rhythm, normal S1 and S2, no gallops, no murmurs, no rubs


ABDOMEN: Soft, nontender.  No hepatosplenomegaly, normal bowel sounds, no 

guarding or rigidity.


EXTREMITIES: No clubbing, no edema, no cyanosis, 2+ pulses and upper and lower 

extremities.


MUSCULOSKELETAL: Muscle strength and tone normal.


SPINE: No scoliosis or deformity


SKIN: No rashes


CENTRAL NERVOUS SYSTEM: Alert and oriented -3.  No focal deficits, tone is 

normal in all 4 extremities.


PSYCHIATRIC: Alert and oriented -3.  Appropriate affect.  Intact judgment and 

insight.











- Labs


CBC & Chem 7: 


                                 01/09/22 06:56





                                 01/09/22 06:56


Labs: 


                  Abnormal Lab Results - Last 24 Hours (Table)











  01/10/22 01/10/22 Range/Units





  07:09 07:09 


 


D-Dimer  0.98 H   (<0.60)  mg/L FEU


 


Lactate Dehydrogenase   310 H  (120-246)  U/L


 


C-Reactive Protein   1.10 H  (0.00-0.80)  mg/dL














Assessment and Plan


Plan: 


 Assessment:





#1.  Acute hypoxic respiratory failure related to acute COVID-19 pneumonia, 

diagnosed in the outpatient setting.  Patient presented to the emergency 

department on 01/07/2022, onset of symptoms was on 12/27/2021.  Patient is 

outside the window for Remdesivir, he is currently on Decadron 6 mg, 

prophylactic Lovenox, and multivitamins, CT angiogram shows no evidence of pulm

onary embolism





#2.  Elevated inflammatory markers related to the above, improving





Plan:





Vital signs stable overnight, breathing is improving


Patient is on room air


No fever or chills


Inflammatory markers are improved


Tolerating ambulation in the room


Stable for discharge home today to complete 10 day course of Decadron


Primary care services discharging people on prophylactic dose Eliquis to have 

milligrams twice daily for 7 days


Outpatient follow-up with Dr. Kemp in the office in 2 weeks








I performed a history & physical examination of the patient and discussed their 

management with my nurse practitioner, Lydia Maldonado.  I reviewed the nurse 

practitioner's note and agree with the documented findings and plan of care.  

Lung sounds are positive for dim breath sounds throughout the lung fields.  The 

findings and the impression was discussed with the patient.  I attest to the 

documentation by the nurse practitioner. 





Time with Patient: Less than 30

## 2022-01-10 NOTE — XR
EXAMINATION TYPE: XR chest 1V portable

 

DATE OF EXAM: 1/10/2022

 

CLINICAL HISTORY: Difficulty breathing and covid progress study.  

 

TECHNIQUE: Single AP portable upright view of the chest is obtained.

 

COMPARISON: Chest x-ray and CTA chest from 3 days earlier

 

FINDINGS:  Bilateral multifocal increased opacities redemonstrated. No pleural effusion or pneumothor
ax seen bilaterally. Cardiac silhouette size stable within normal limits. Osseous structures are inta
ct.

 

IMPRESSION: Bilateral multifocal opacities consistent with covid-19 infection remain present. No sign
ificant change from most recent prior studies.

## 2022-01-10 NOTE — P.DS
Dr. Michael Alfredo reviewed discharge instructions with the patient. The patient verbalized understanding. All questions and concerns were addressed. The patient is discharged via wheelchair in the care of family members with instructions and prescriptions in hand. Pt is alert and oriented x 4. Respirations are clear and unlabored. Providers


Date of admission: 


01/07/22 22:34





Expected date of discharge: 01/10/22


Attending physician: 


Rajinder Scott MD





Consults: 





                                        





01/07/22 22:56


Consult Physician Urgent 


   Consulting Provider: Tapan Kemp Reason/Comments: hypoxia


   Do you want consulting provider notified?: Yes











Primary care physician: 


[facility] Course: 





41-year-old male with no significant past medical history who presented with low

O2 sats. He was diagnosed with Covid about 12 days prior to admission. Patient 

is unvaccinated. He initially had symptoms of upper respiratory infection 

including coughing,  body aches, loss of smell and taste, symptoms gradually 

improved except for his coughing and shortness of breath. His wife has been 

checking his oxygen and noted his oxygen was low in the upper 80s. He's been 

taking inhalers at home along with prednisone. Patient is not a smoker, denies 

any drug abuse





Upon evaluation in the ED his oxygen saturation was in the low 90s and then 

dropped to 8788 percent while on CAT scan. Chest computed tomography scan 

showed interstitial pneumonia. He was initiated on oxygen his oxygen saturation 

has improved to 92% patient admitted for further monitoring and evaluation. 





He was started on decadron IV as well as lovenox for DVT prophylaxis. He was 

seen by pulmonary. He was not a candidate for remdisivir as he was out of the 

therapeutic window. Through the hospitalization his symptoms improoved. His O2 

sats were assessed today by walking and he only dropped to 89%. Did not qualify 

for home O2. He remained stable. He will be discharged in a stable condition. 





Time for discharge 36 min


Patient Condition at Discharge: Stable





Plan - Discharge Summary


Discharge Rx Participant: Yes


New Discharge Prescriptions: 


New


   Dexamethasone [Decadron] 6 mg PO DAILY 7 Days #7 tablet


   Apixaban [Eliquis] 2.5 mg PO BID 7 Days #14 tab





Continue


   Multivitamins, Thera [Multivitamin (formulary)] 1 tab PO DAILY


   Ascorbic Acid [Vitamin C] 1,000 mg PO DAILY


   Acetaminophen Tab [Tylenol] 1,000 mg PO Q6HR PRN


     PRN Reason: Pain Or Fever > 100.5


   Ibuprofen [Motrin Ib] 800 mg PO Q8H PRN


     PRN Reason: Pain Or Fever > 100.5


   Benzonatate [Benzonatate Perle] 200 mg PO TID PRN


     PRN Reason: Cough


   Albuterol Inhaler [Ventolin Hfa Inhaler] 2 puff INHALATION RT-QID PRN


     PRN Reason: Shortness Of Breath





Discontinued


   predniSONE [Deltasone] 20 mg PO TID


Discharge Medication List





Acetaminophen Tab [Tylenol] 1,000 mg PO Q6HR PRN 01/07/22 [History]


Albuterol Inhaler [Ventolin Hfa Inhaler] 2 puff INHALATION RT-QID PRN 01/07/22 

[History]


Ascorbic Acid [Vitamin C] 1,000 mg PO DAILY 01/07/22 [History]


Benzonatate [Benzonatate Perle] 200 mg PO TID PRN 01/07/22 [History]


Ibuprofen [Motrin Ib] 800 mg PO Q8H PRN 01/07/22 [History]


Multivitamins, Thera [Multivitamin (formulary)] 1 tab PO DAILY 01/07/22 

[History]


Apixaban [Eliquis] 2.5 mg PO BID 7 Days #14 tab 01/10/22 [Rx]


Dexamethasone [Decadron] 6 mg PO DAILY 7 Days #7 tablet 01/10/22 [Rx]








Follow up Appointment(s)/Referral(s): 


Naga Reyes DO [Primary Care Provider] - 1-2 days (patient to call after 

D/C and schedule appointment **PER DOCTORS OFFICE)


Patient Instructions/Handouts:  Coronavirus Disease 2019 (COVID-19)


Discharge/Stand Alone Forms:  Work/Release Restrictions Form

## 2022-11-12 ENCOUNTER — HOSPITAL ENCOUNTER (EMERGENCY)
Dept: HOSPITAL 47 - EC | Age: 42
Discharge: OTHER INSTITUTION | End: 2022-11-12
Payer: COMMERCIAL

## 2022-11-12 VITALS
HEART RATE: 74 BPM | SYSTOLIC BLOOD PRESSURE: 121 MMHG | TEMPERATURE: 98 F | DIASTOLIC BLOOD PRESSURE: 77 MMHG | RESPIRATION RATE: 16 BRPM

## 2022-11-12 DIAGNOSIS — U07.1: Primary | ICD-10-CM

## 2022-11-12 PROCEDURE — 99284 EMERGENCY DEPT VISIT MOD MDM: CPT

## 2022-11-12 PROCEDURE — 71046 X-RAY EXAM CHEST 2 VIEWS: CPT

## 2022-11-12 NOTE — XR
EXAMINATION TYPE: XR chest 2V

 

DATE OF EXAM: 11/12/2022

 

COMPARISON: 1/10/2022

 

HISTORY: Cough and congestion

 

TECHNIQUE:

 

FINDINGS: Heart and mediastinum are normal. Lungs are clear. Diaphragm is normal. Bony thorax is inta
ct.

 

IMPRESSION: Normal chest. There is clearing of the patchy pulmonary infiltrates compared to old exam.

## 2022-11-12 NOTE — ED
General Adult HPI





- General


Chief complaint: Upper Respiratory Infection


Stated complaint: Covid,Needs Chest xray


Time Seen by Provider: 11/12/22 20:20


Source: patient, RN notes reviewed, old records reviewed


Mode of arrival: ambulatory


Limitations: no limitations





- History of Present Illness


Initial comments: 





Patient is a 42-year-old male with past medical history remarkable for COVID-19 

pneumonia in January 2022 who presents to the emergency department complaining 

of active COVID-19 infection.  Took a home test.  Was positive.  He has been 

having rhinorrhea, congestion.  Was concerned due to his prior hospitalization 

for COVID-19 pneumonia and had a total health visit who recommended that he come

to the emergency department for chest x-ray.  Denies any productive cough.  

Endorses fevers that are responsive to Tylenol and Motrin.  Denies any nausea, 

vomiting, diarrhea.  Denies any shortness of breath.  His no other acute 

complaints at this time.  Presents for chest x-ray.





- Related Data


                                Home Medications











 Medication  Instructions  Recorded  Confirmed


 


Acetaminophen Tab [Tylenol] 1,000 mg PO Q6HR PRN 01/07/22 01/07/22


 


Albuterol Inhaler [Ventolin Hfa 2 puff INHALATION RT-QID PRN 01/07/22 01/07/22





Inhaler]   


 


Ascorbic Acid [Vitamin C] 1,000 mg PO DAILY 01/07/22 01/07/22


 


Benzonatate [Tessalon Perle] 200 mg PO TID PRN 01/07/22 01/07/22


 


Ibuprofen [Motrin Ib] 800 mg PO Q8H PRN 01/07/22 01/07/22


 


Multivitamins, Thera [Multivitamin 1 tab PO DAILY 01/07/22 01/07/22





(formulary)]   








                                  Previous Rx's











 Medication  Instructions  Recorded


 


Apixaban [Eliquis] 2.5 mg PO BID 7 Days #14 tab 01/10/22


 


dexAMETHasone [Decadron] 6 mg PO DAILY 7 Days #7 tablet 01/10/22


 


dexAMETHasone [Decadron] 4 mg PO DAILY 3 Days #3 tablet 11/12/22











                                    Allergies











Allergy/AdvReac Type Severity Reaction Status Date / Time


 


No Known Allergies Allergy   Verified 11/12/22 19:37














Review of Systems


ROS Statement: 


Those systems with pertinent positive or pertinent negative responses have been 

documented in the HPI.


Review of Systems:


CONST: Denies fever 


EYES: Denies blurry vision 


ENT: Endorses nasal congestion.


C/V:  Denies Chest pain


RESP: Denies shortness of breath 


GI: Denies abdominal pain 


: Denies dysuria  


SKIN: Denies rash.


MSK: Denies joint pain.


NEURO: Denies headache 


ROS Other: All systems not noted in ROS Statement are negative.





Past Medical History


Past Medical History: No Reported History


History of Any Multi-Drug Resistant Organisms: None Reported


Past Surgical History: No Surgical Hx Reported


Past Psychological History: No Psychological Hx Reported


Smoking Status: Never smoker


Past Alcohol Use History: Occasional


Past Drug Use History: None Reported





- Past Family History


  ** Family


Family Medical History: No Reported History





General Exam





- General Exam Comments


Initial Comments: 





General: Appears in no acute distress.


HEAD:  Normal with no signs of head trauma.


EYES: EOMI


ENT:  Hearing grossly intact, normal oropharynx.


RESPIRATORY:  Clear breath sounds bilaterally.  No wheezes, rales, or rhonchi.  

No hypoxia.  No respiratory distress.


C/V:  Regular rate and rhythm. S1 and S2 auscultated, peripheral pulses 2+ and 

intact throughout


ABD:  Abd is soft, nontender, nondistended


EXT: Normal range of motion, no obvious deformity


SKIN:  No rashes or lesions observed on exposed skin.


NEURO: Alert and oriented 4.


Limitations: no limitations





Course


                                   Vital Signs











  11/12/22





  19:37


 


Temperature 98 F


 


Pulse Rate 74


 


Respiratory 16





Rate 


 


Blood Pressure 121/77


 


O2 Sat by Pulse 98





Oximetry 














Medical Decision Making





- Medical Decision Making





Based on patient's presentation and physical exam, he presents complaining of 

COVID-19 infection.  Would like a chest x-ray.  Is currently day 2 of symptoms. 

 Covid swab was positive at home.  Chest x-ray as interpreted by me showed no 

acute cardiopulmonary process.  No acute infiltrate.  Clear lungs.  Heart is 

normal.  No bony traumatic injury.





At this time I evaluated the patient.  Vital signs are within acceptable limits.

  I do believe it is safe for the patient be discharged home.  We discussed his 

results.  We discussed Paxlovid which he refused.  He requests steroids which I 

will provide.  He'll be given a dose of Decadron as well as a three-day 

prescription.  We discussed quarantine for 5 days after symptom onset.  

Quarantine also for at least 24-hour fever free.  He was in agreement this plan.

  He will be given a work note.





Strict return precautions were discussed.





I will provide the patient with a prescription for Decadron. I instructed the 

patient to follow up with their PCP in the next 1-3 days.  I explained that the 

patient should return to the emergency department if they experience any 

worsening symptoms. Strict return precautions were discussed with the patient. 

The patient expressed understanding of these instructions. I answered all 

questions that the patient had. The patient was discharged home in good 

condition with their prescriptions and follow up information.





Disposition


Clinical Impression: 


 COVID-19 virus infection





Disposition: ADMITTED AS IP TO THIS HOSP


Condition: Good


Instructions (If sedation given, give patient instructions):  COVID-19 

(Coronavirus Disease 2019) (ED)


Prescriptions: 


dexAMETHasone [Decadron] 4 mg PO DAILY 3 Days #3 tablet


Is patient prescribed a controlled substance at d/c from ED?: No


Referrals: 


Naga Reyes DO [Primary Care Provider] - 1-2 days


Time of Disposition: 20:35